# Patient Record
Sex: MALE | URBAN - METROPOLITAN AREA
[De-identification: names, ages, dates, MRNs, and addresses within clinical notes are randomized per-mention and may not be internally consistent; named-entity substitution may affect disease eponyms.]

---

## 2020-07-17 VITALS
TEMPERATURE: 97.6 F | HEIGHT: 71 IN | WEIGHT: 245 LBS | SYSTOLIC BLOOD PRESSURE: 140 MMHG | RESPIRATION RATE: 16 BRPM | DIASTOLIC BLOOD PRESSURE: 82 MMHG | HEART RATE: 150 BPM | BODY MASS INDEX: 34.3 KG/M2 | OXYGEN SATURATION: 95 %

## 2020-07-17 PROBLEM — Z98.84 H/O BARIATRIC SURGERY: Status: ACTIVE | Noted: 2020-07-17

## 2020-07-17 PROBLEM — K90.9 MALABSORPTION SYNDROME: Status: ACTIVE | Noted: 2020-07-17

## 2020-07-17 PROBLEM — Z86.39 HISTORY OF DIABETES MELLITUS: Status: ACTIVE | Noted: 2020-07-17

## 2020-07-17 RX ORDER — CYCLOBENZAPRINE HCL 10 MG
TABLET ORAL
COMMUNITY
End: 2020-12-04

## 2020-07-17 RX ORDER — METOPROLOL TARTRATE 25 MG/1
TABLET, FILM COATED ORAL 2 TIMES DAILY
COMMUNITY
End: 2020-12-04

## 2020-12-04 ENCOUNTER — VIRTUAL VISIT (OUTPATIENT)
Dept: FAMILY MEDICINE CLINIC | Age: 55
End: 2020-12-04
Payer: COMMERCIAL

## 2020-12-04 DIAGNOSIS — J18.9 PNEUMONIA DUE TO INFECTIOUS ORGANISM, UNSPECIFIED LATERALITY, UNSPECIFIED PART OF LUNG: ICD-10-CM

## 2020-12-04 DIAGNOSIS — J96.01 ACUTE RESPIRATORY FAILURE WITH HYPOXIA AND HYPERCAPNIA (HCC): ICD-10-CM

## 2020-12-04 DIAGNOSIS — J96.02 ACUTE RESPIRATORY FAILURE WITH HYPOXIA AND HYPERCAPNIA (HCC): ICD-10-CM

## 2020-12-04 DIAGNOSIS — C90.00 MULTIPLE MYELOMA, REMISSION STATUS UNSPECIFIED (HCC): ICD-10-CM

## 2020-12-04 DIAGNOSIS — Z09 HOSPITAL DISCHARGE FOLLOW-UP: Primary | ICD-10-CM

## 2020-12-04 DIAGNOSIS — R52 PAIN: ICD-10-CM

## 2020-12-04 DIAGNOSIS — S22.49XS CLOSED FRACTURE OF MULTIPLE RIBS, UNSPECIFIED LATERALITY, SEQUELA: ICD-10-CM

## 2020-12-04 DIAGNOSIS — R53.1 WEAKNESS GENERALIZED: ICD-10-CM

## 2020-12-04 DIAGNOSIS — S80.11XS HEMATOMA OF LEG, RIGHT, SEQUELA: ICD-10-CM

## 2020-12-04 PROBLEM — S22.49XA CLOSED FRACTURE OF MULTIPLE RIBS: Status: ACTIVE | Noted: 2020-12-04

## 2020-12-04 PROCEDURE — 99214 OFFICE O/P EST MOD 30 MIN: CPT | Performed by: STUDENT IN AN ORGANIZED HEALTH CARE EDUCATION/TRAINING PROGRAM

## 2020-12-04 RX ORDER — LANOLIN ALCOHOL/MO/W.PET/CERES
100 CREAM (GRAM) TOPICAL DAILY
COMMUNITY

## 2020-12-04 RX ORDER — BISMUTH SUBSALICYLATE 262 MG
1 TABLET,CHEWABLE ORAL DAILY
COMMUNITY

## 2020-12-04 RX ORDER — METOPROLOL TARTRATE 37.5 MG/1
1 TABLET, FILM COATED ORAL 2 TIMES DAILY
COMMUNITY
Start: 2020-09-03

## 2020-12-04 RX ORDER — CALCIUM CARBONATE/VITAMIN D3 500 MG-10
1 TABLET ORAL 3 TIMES DAILY
COMMUNITY

## 2020-12-04 RX ORDER — OXYCODONE HYDROCHLORIDE 10 MG/1
15 TABLET ORAL EVERY 12 HOURS
COMMUNITY
End: 2020-12-07 | Stop reason: SDUPTHER

## 2020-12-04 RX ORDER — GUAIFENESIN 600 MG/1
600 TABLET, EXTENDED RELEASE ORAL
COMMUNITY

## 2020-12-04 RX ORDER — FOLIC ACID 1 MG/1
1 TABLET ORAL DAILY
COMMUNITY

## 2020-12-04 RX ORDER — ONDANSETRON 4 MG/1
4 TABLET, FILM COATED ORAL
COMMUNITY
End: 2020-12-31

## 2020-12-04 RX ORDER — GABAPENTIN 600 MG/1
600 TABLET ORAL 3 TIMES DAILY
COMMUNITY
End: 2021-01-07 | Stop reason: SDUPTHER

## 2020-12-04 NOTE — PROGRESS NOTES
Consent: Danelle Osullivan, who was seen by synchronous (real-time) audio-video technology, and/or his healthcare decision maker, is aware that this patient-initiated, Telehealth encounter on 12/4/2020 is a billable service, with coverage as determined by his insurance carrier. He is aware that he may receive a bill and has provided verbal consent to proceed: YES-Consent obtained within past 12 months        712  Subjective:   Chief complaint: Respiratory failure, hospital follow-up    Danelle Osullivan is a 48 y.o. male who was seen for Hospital Follow Up (Respir failure [facility] 10/27/20-11/25/20)  Patient was contacted by Transitional Care Management services within two days after his discharge: Yes. This encounter and supporting documentation was reviewed if available. Medication reconciliation was performed today (12/4/2020). At this time I do not have the discharge summary notes available. We have requested them. History of mention here in this note is from his daughter and the patient. Patient presents via virtual visit today for hospital follow-up. Patient was admitted to [facility] with respiratory failure he was found to have ARDS, hypoxia, hypercapnia, pneumonia and was eventually intubated and transferred to the ICU. Treated with antibiotics for pneumonia. He was in hospital for a total of 6 weeks, back in forth between ICU multiple times. He was eventually discharged home with 3 L of oxygen, and BiPAP for the hypercapnia when sleeping. He is currently doing well on 2 L of oxygen and BiPAP, denies shortness of breath, or increased work of breathing. She is scheduled to follow-up with pulmonology at [facility]. While in the hospital he was diagnosed with multiple myeloma. Hypercalcemia was noted on labs, so oncology was consulted and further testing came back positive for MM. Diagnosis was confirmed with bone marrow biopsy. Imaging showed multiple bony lesions all over his body.   He was found to have rib fractures as well. He did have a fall about 2 months ago. He was discharged on oxycodone and gabapentin for the pain is scheduled to follow-up with oncology. Due to bleeding from laying in bed during his stay he was given physical therapy which improved his function. He still has difficulty moving but is able to transfer. He was ordered home physical therapy which will be started next week. He is currently using an elevated toilet when he goes to the bathroom, and uses a chair while bathing. He is currently getting help by his daughter and wife. Home health nurse visits multiple times a week, checks his lungs for wheezing, checks vitals. No wheezing today. He was discharged on other oral medications including thiamine, multivitamin, B12, vitamin D/calcium, folic acid, Mucinex, etc.  Daughter will be bringing in list on Monday. Patient also has a hematoma on his right leg which is improving, likely from central line placement. Printed daughter a stent was placed. Hematoma was originally about the size of a softball is much smaller now according to them. Home health care checks the hematoma as well. He said he was instructed to keep his legs elevated. Admitting symptoms have: Improved. Prior to Admission medications    Medication Sig Start Date End Date Taking? Authorizing Provider   metoprolol tartrate 37.5 mg tab Take 1 Tab by mouth two (2) times a day. 9/3/20  Yes Provider, Historical   folic acid (FOLVITE) 1 mg tablet Take 1 mg by mouth daily. Yes Provider, Historical   multivitamin (ONE A DAY) tablet Take 1 Tab by mouth daily. Yes Provider, Historical   thiamine HCL (B-1) 100 mg tablet Take 100 mg by mouth daily. Yes Provider, Historical   Calcium-Cholecalciferol, D3, 500 mg(1,250mg) -400 unit tab Take 1 Tab by mouth three (3) times daily. Yes Provider, Historical   gabapentin (NEURONTIN) 600 mg tablet Take 600 mg by mouth three (3) times daily.    Yes Provider, Historical   oxyCODONE IR (ROXICODONE) 10 mg tab immediate release tablet Take 15 mg by mouth every twelve (12) hours. Yes Provider, Historical   ondansetron hcl (ZOFRAN) 4 mg tablet Take 4 mg by mouth every eight (8) hours as needed for Nausea or Vomiting. Yes Provider, Historical   bipap machine kit by Does Not Apply route. Use when taking nap or sleeping at night   Yes Provider, Historical   oxygen-air delivery systems (PV CLASSIC OXYGEN CONCENTRATOR) 3 L by Does Not Apply route continuous. Yes Provider, Historical   guaiFENesin ER (Mucinex) 600 mg ER tablet Take 600 mg by mouth two (2) times daily as needed for Congestion. Yes Provider, Historical   apixaban (Eliquis) 5 mg tablet Take 5 mg by mouth two (2) times a day. Yes Provider, Historical   cyclobenzaprine (FLEXERIL) 10 mg tablet Take  by mouth three (3) times daily as needed for Muscle Spasm(s). 12/4/20  Provider, Historical   metoprolol tartrate (LOPRESSOR) 25 mg tablet Take  by mouth two (2) times a day. 12/4/20  Provider, Historical     Allergies   Allergen Reactions    Hydrocodone Nausea and Vomiting     Patient Active Problem List    Diagnosis    H/O bariatric surgery    History of diabetes mellitus    Malabsorption syndrome     Past Medical History:   Diagnosis Date    Arrhythmia     Atrial Flutter    Back pain     and neck    Diabetes ([address]     Resolved w/ gastric bypass surgery    Hx of long term use of blood thinners        Review of Systems   Constitutional: Negative for fever. Respiratory: Negative for shortness of breath. Cardiovascular: Negative for chest pain. Skin:        Hematoma   Neurological: Positive for weakness. Objective:   Vital Signs: (As obtained by patient/caregiver at home)  There were no vitals taken for this visit.      [INSTRUCTIONS:  \"[x]\" Indicates a positive item  \"[]\" Indicates a negative item  -- DELETE ALL ITEMS NOT EXAMINED]    Constitutional: [x] Appears well-developed and well-nourished [x] No apparent distress      [] Abnormal -     Mental status: [x] Alert and awake  [x] Oriented to person/place/time [x] Able to follow commands    [] Abnormal -     Eyes:   EOM    [x]  Normal    [] Abnormal -   Sclera  [x]  Normal    [] Abnormal -          Discharge [x]  None visible   [] Abnormal -     HENT: [x] Normocephalic, atraumatic  [] Abnormal -   [x] Mouth/Throat: Mucous membranes are moist    External Ears [x] Normal  [] Abnormal -    Neck: [x] No visualized mass [] Abnormal -     Pulmonary/Chest: [x] Respiratory effort normal   [x] No visualized signs of difficulty breathing or respiratory distress       On 3 L of oxygen nasal cannula       Neurological:        [x] No Facial Asymmetry (Cranial nerve 7 motor function) (limited exam due to video visit)          [x] No gaze palsy        [] Abnormal -          Skin:        [x] No significant exanthematous lesions or discoloration noted on facial skin         [] Abnormal -            Psychiatric:       [x] Normal Affect [] Abnormal -        [x] No Hallucinations    Other pertinent observable physical exam findings:-              Assessment & Plan:       1. Hospital discharge follow-up  -Discussed hospitalization with patient and daughter. Requested notes from hospitalization. Daughter with medication list to the office. Home health care ordered. 2. Acute respiratory failure with hypoxia and hypercapnia (HCC)      ARDS  -Improving. Currently on 2 L of oxygen, and BiPAP. Scheduled to follow-up with pulmonology at [facility]. 3. Multiple myeloma, remission status unspecified ([facility] [address]  -Newly diagnosed. Scheduled to follow-up with oncology. 4. Closed fracture of multiple ribs, unspecified laterality, sequela  -Secondary to multiple myeloma, and fall. 5. Pneumonia due to infectious organism, unspecified laterality, unspecified part of lung    6. Weakness generalized  -Home physical therapy    7. Pain  -Secondary to multiple myeloma. Currently on gabapentin and oxycodone. 8.  Right leg hematoma   - improving. Keep legs elevated. Being monitored by home health care. Follow-up and Dispositions    · Return in about 3 months (around 3/4/2021) for Wellness, or prior if needed. We discussed the expected course, resolution and complications of the diagnosis(es) in detail. Medication risks, benefits, costs, interactions, and alternatives were discussed as indicated. I advised him to contact the office if his condition worsens, changes or fails to improve as anticipated. He expressed understanding with the diagnosis(es) and plan. Monika Pulido is a 48 y.o. male being evaluated by a video visit encounter for concerns as above. A caregiver was present when appropriate. Due to this being a TeleHealth encounter (During [place] public health emergency), evaluation of the following organ systems was limited: Vitals/Constitutional/EENT/Resp/CV/GI//MS/Neuro/Skin/Heme-Lymph-Imm. Pursuant to the emergency declaration under the [facility], [facility] waiver authority and the [employer] Act, this Virtual  Visit was conducted, with patient's (and/or legal guardian's) consent, to reduce the patient's risk of exposure to COVID-19 and provide necessary medical care. Services were provided through a video synchronous discussion virtually to substitute for in-person clinic visit. Patient and provider were located at their individual homes.         Bharath Rodriguez MD

## 2020-12-07 DIAGNOSIS — C90.00 MULTIPLE MYELOMA, REMISSION STATUS UNSPECIFIED (HCC): Primary | ICD-10-CM

## 2020-12-07 DIAGNOSIS — S22.49XS CLOSED FRACTURE OF MULTIPLE RIBS, UNSPECIFIED LATERALITY, SEQUELA: ICD-10-CM

## 2020-12-07 RX ORDER — MELATONIN
1 DAILY
COMMUNITY
End: 2020-12-31

## 2020-12-07 RX ORDER — SENNOSIDES 8.6 MG/1
1 TABLET ORAL DAILY
COMMUNITY
End: 2020-12-31

## 2020-12-07 RX ORDER — ACETAMINOPHEN, DIPHENHYDRAMINE HCL, PHENYLEPHRINE HCL 325; 25; 5 MG/1; MG/1; MG/1
1 TABLET ORAL DAILY
COMMUNITY

## 2020-12-07 RX ORDER — OXYCODONE HYDROCHLORIDE 10 MG/1
10 TABLET ORAL
Qty: 60 TAB | Refills: 0 | Status: SHIPPED | OUTPATIENT
Start: 2020-12-07 | End: 2020-12-21 | Stop reason: SDUPTHER

## 2020-12-07 RX ORDER — PANTOPRAZOLE SODIUM 20 MG/1
20 TABLET, DELAYED RELEASE ORAL DAILY
COMMUNITY
End: 2020-12-31

## 2020-12-07 NOTE — PROGRESS NOTES
Pt's daughter called stating that pt needed a PA for the medication (Oxycodone). PA done and approved PA Case: [id number], Status: Approved, Coverage Starts on: 12/7/2020 12:00:00 AM, Coverage Ends on: 3/7/2021 12:00:00 AM.  Pt advised.

## 2020-12-21 ENCOUNTER — TELEPHONE (OUTPATIENT)
Dept: FAMILY MEDICINE CLINIC | Age: 55
End: 2020-12-21

## 2020-12-21 DIAGNOSIS — S22.49XS CLOSED FRACTURE OF MULTIPLE RIBS, UNSPECIFIED LATERALITY, SEQUELA: ICD-10-CM

## 2020-12-21 DIAGNOSIS — C90.00 MULTIPLE MYELOMA, REMISSION STATUS UNSPECIFIED (HCC): ICD-10-CM

## 2020-12-21 RX ORDER — OXYCODONE HYDROCHLORIDE 10 MG/1
10 TABLET ORAL
Qty: 60 TAB | Refills: 0 | Status: SHIPPED | OUTPATIENT
Start: 2020-12-21 | End: 2021-01-20

## 2020-12-21 NOTE — TELEPHONE ENCOUNTER
RX refill oxyCODINE IR 10 mg tab please send to [id number] [facility]

## 2020-12-22 ENCOUNTER — TELEPHONE (OUTPATIENT)
Dept: FAMILY MEDICINE CLINIC | Age: 55
End: 2020-12-22

## 2020-12-22 NOTE — TELEPHONE ENCOUNTER
PT called and stated that his RX oxyCODONE IR 10 mg was sent incorrectly. It says for pt to take ever 8 hrs as needed for pain. PT stated that his last RX of oxyCODONE IR 10 mg stated to take every 4 hours as needed. It also needs prior authorization.   Thank you

## 2020-12-23 NOTE — TELEPHONE ENCOUNTER
Patient is incorrect oxycodone  was reordered exactly the same way, as I ordered last time. If you can send me the preauthorization via the EMR I will do it today. If it is a paper preauthorization, then have one of the provider's that are in the office do it.

## 2020-12-28 NOTE — TELEPHONE ENCOUNTER
Pre authorization was already approved and the oncologist advised him to take the medication every four hours

## 2020-12-31 ENCOUNTER — VIRTUAL VISIT (OUTPATIENT)
Dept: FAMILY MEDICINE CLINIC | Age: 55
End: 2020-12-31
Payer: COMMERCIAL

## 2020-12-31 DIAGNOSIS — J96.01 ACUTE RESPIRATORY FAILURE WITH HYPOXIA AND HYPERCAPNIA (HCC): ICD-10-CM

## 2020-12-31 DIAGNOSIS — F43.22 ADJUSTMENT DISORDER WITH ANXIOUS MOOD: Primary | ICD-10-CM

## 2020-12-31 DIAGNOSIS — C90.00 MULTIPLE MYELOMA, REMISSION STATUS UNSPECIFIED (HCC): ICD-10-CM

## 2020-12-31 DIAGNOSIS — J96.02 ACUTE RESPIRATORY FAILURE WITH HYPOXIA AND HYPERCAPNIA (HCC): ICD-10-CM

## 2020-12-31 PROCEDURE — 99214 OFFICE O/P EST MOD 30 MIN: CPT | Performed by: STUDENT IN AN ORGANIZED HEALTH CARE EDUCATION/TRAINING PROGRAM

## 2020-12-31 RX ORDER — ESCITALOPRAM OXALATE 5 MG/1
5 TABLET ORAL DAILY
Qty: 30 TAB | Refills: 1 | Status: SHIPPED | OUTPATIENT
Start: 2020-12-31

## 2020-12-31 NOTE — PROGRESS NOTES
Consent: Aleah Gordon, who was seen by synchronous (real-time) audio-video technology, and/or his healthcare decision maker, is aware that this patient-initiated, Telehealth encounter on 12/31/2020 is a billable service, with coverage as determined by his insurance carrier. He is aware that he may receive a bill and has provided verbal consent to proceed: YES-Consent obtained within past 12 months        712  Subjective:   Aleah Gordon is a 48 y.o. male who was seen for Follow-up (Anxiety issues)    Complains of  anxiety, irritability, anger. Symptoms started about 2 weeks ago. Patient was recently admitted to the hospital found to have multiple myeloma, pneumonia, ARDS. He is on oxygen now. Feels like the symptoms are due to his cancer diagnosis, and worrying about his family. Unsure if he is depressed. His ALBINA-7 today was 17. Denies SI/HI. He has never been on antidepressant before. About a month ago was released from the hospital he was found to have acute respiratory failure, ARDS, multiple myeloma, HCAP, rib fractures, right leg hematoma (due to central line). Since having a virtual visit with him about a month ago he continues to be at home. His O2 had to be raised to 4 L, but now is down to 3 L. He continues to have occupational therapy, physical therapy, nurse, home health care visit him. He has been seeing his oncologist via virtual visit, he will be starting chemo and steroid therapy. Oncologist is Titus Cooks, and PM and R Charu Castillo. Current Outpatient Medications on File Prior to Visit   Medication Sig Dispense Refill    oxyCODONE IR (ROXICODONE) 10 mg tab immediate release tablet Take 1 Tab by mouth every eight (8) hours as needed for Pain for up to 30 days. Max Daily Amount: 30 mg. 60 Tab 0    melatonin 10 mg tab Take 1 Tab by mouth daily.  metoprolol tartrate 37.5 mg tab Take 1 Tab by mouth two (2) times a day.       folic acid (FOLVITE) 1 mg tablet Take 1 mg by mouth daily.  multivitamin (ONE A DAY) tablet Take 1 Tab by mouth daily.  thiamine HCL (B-1) 100 mg tablet Take 100 mg by mouth daily.  Calcium-Cholecalciferol, D3, 500 mg(1,250mg) -400 unit tab Take 1 Tab by mouth three (3) times daily.  gabapentin (NEURONTIN) 600 mg tablet Take 600 mg by mouth three (3) times daily.  bipap machine kit by Does Not Apply route. Use when taking nap or sleeping at night      oxygen-air delivery systems (PV CLASSIC OXYGEN CONCENTRATOR) 3 L by Does Not Apply route continuous.  guaiFENesin ER (Mucinex) 600 mg ER tablet Take 600 mg by mouth two (2) times daily as needed for Congestion.  apixaban (Eliquis) 5 mg tablet Take 5 mg by mouth two (2) times a day.  [DISCONTINUED] pantoprazole (PROTONIX) 20 mg tablet Take 20 mg by mouth daily.  [DISCONTINUED] senna (SENOKOT) 8.6 mg tablet Take 1 Tab by mouth daily.  [DISCONTINUED] cholecalciferol (VITAMIN D3) (1000 Units /25 mcg) tablet Take 1 Tab by mouth daily.  [DISCONTINUED] ondansetron hcl (ZOFRAN) 4 mg tablet Take 4 mg by mouth every eight (8) hours as needed for Nausea or Vomiting. No current facility-administered medications on file prior to visit.       Allergies   Allergen Reactions    Hydrocodone Nausea and Vomiting     Patient Active Problem List    Diagnosis    Hematoma of leg, right, sequela    Pain    Weakness generalized    Multiple myeloma ([address]    Acute respiratory failure with hypoxia and hypercapnia (HCC)    Closed fracture of multiple ribs    H/O bariatric surgery    History of diabetes mellitus    Malabsorption syndrome     Past Medical History:   Diagnosis Date    ARDS (adult respiratory distress syndrome) ([address] 10/2020    Arrhythmia     Atrial Flutter    Back pain     and neck    Diabetes ([address]     Resolved w/ gastric bypass surgery    Hx of long term use of blood thinners        Review of Systems   Constitutional: Negative for fever. Respiratory:        As stated in HPI     Psychiatric/Behavioral: The patient is nervous/anxious. Objective:   Vital Signs: (As obtained by patient/caregiver at home)  There were no vitals taken for this visit. [INSTRUCTIONS:  \"[x]\" Indicates a positive item  \"[]\" Indicates a negative item  -- DELETE ALL ITEMS NOT EXAMINED]    Constitutional: [x] Appears well-developed and well-nourished [x] No apparent distress      [] Abnormal -     Mental status: [x] Alert and awake  [x] Oriented to person/place/time [x] Able to follow commands    [] Abnormal -     Eyes:   EOM    [x]  Normal    [] Abnormal -   Sclera  [x]  Normal    [] Abnormal -          Discharge [x]  None visible   [] Abnormal -     HENT: [x] Normocephalic, atraumatic  [] Abnormal -   [x] Mouth/Throat: Mucous membranes are moist    External Ears [x] Normal  [] Abnormal -    Neck: [x] No visualized mass [] Abnormal -     Pulmonary/Chest: [x] Respiratory effort normal   [x] No visualized signs of difficulty breathing or respiratory distress        [x] Abnormal -on 3L O2 NC       Neurological:        [x] No Facial Asymmetry (Cranial nerve 7 motor function) (limited exam due to video visit)          [x] No gaze palsy        [] Abnormal -          Skin:        [x] No significant exanthematous lesions or discoloration noted on facial skin         [] Abnormal -            Psychiatric:       [x] Normal Affect [] Abnormal -        [x] No Hallucinations                  Assessment & Plan:       1. Adjustment disorder with anxious mood  -    Secondary to recent diagnosis of multiple myeloma, being homebound. ALBINA-717. Likely has depression/or down mood as well. Start Lexapro. Follow-up in 1 month. -     escitalopram oxalate (LEXAPRO) 5 mg tablet; Take 1 Tab by mouth daily. 2. Multiple myeloma, remission status unspecified ([facility] [address]  -Following with heme-onc, will start chemotherapy and steroids soon.   Will attempt to obtain records from heme-onc. Continues oxycodone for pain. 3. Acute respiratory failure with hypoxia and hypercapnia (HCC)  -Currently on 3L O2. Continue on O2, wean as tolerated. Continues to have OT, PT, PMand R, home health care, nurse who sees him at home, since being discharged from the hospital.           Follow-up and Dispositions    · Return in about 4 weeks (around 1/28/2021) for Anxiety. We discussed the expected course, resolution and complications of the diagnosis(es) in detail. Medication risks, benefits, costs, interactions, and alternatives were discussed as indicated. I advised him to contact the office if his condition worsens, changes or fails to improve as anticipated. He expressed understanding with the diagnosis(es) and plan. Nimco Trejo is a 48 y.o. male being evaluated by a video visit encounter for concerns as above. A caregiver was present when appropriate. Due to this being a TeleHealth encounter (During [place] [address] public health emergency), evaluation of the following organ systems was limited: Vitals/Constitutional/EENT/Resp/CV/GI//MS/Neuro/Skin/Heme-Lymph-Imm. Pursuant to the emergency declaration under the [facility] waiver authority and the [employer] Act, this Virtual  Visit was conducted, with patient's (and/or legal guardian's) consent, to reduce the patient's risk of exposure to COVID-19 and provide necessary medical care. Services were provided through a video synchronous discussion virtually to substitute for in-person clinic visit. Patient and provider were located at their individual homes.         Juan J Retana MD

## 2020-12-31 NOTE — PATIENT INSTRUCTIONS
Escitalopram (By mouth) Escitalopram (ui-[id number]-IAD-oh-pram) Treats depression and generalized anxiety disorder (ALBINA). Brand Name(s): Lexapro There may be other brand names for this medicine. When This Medicine Should Not Be Used: This medicine is not right for everyone. Do not use it if you had an allergic reaction to escitalopram or citalopram. 
How to Use This Medicine:  
Liquid, Tablet · Take this medicine as directed. You may need to take it for a month or more before you feel better. Your dose may need to be changed to find out what works best for you. · Measure the oral liquid medicine with a marked measuring spoon, oral syringe, or medicine cup. · This medicine should come with a Medication Guide. Ask your pharmacist for a copy if you do not have one. · Missed dose: Take a dose as soon as you remember. If it is almost time for your next dose, wait until then and take a regular dose. Do not take extra medicine to make up for a missed dose. · Store the medicine in a closed container at room temperature, away from heat, moisture, and direct light. Drugs and Foods to Avoid: Ask your doctor or pharmacist before using any other medicine, including over-the-counter medicines, vitamins, and herbal products. · Do not use this medicine together with pimozide. Do not use this medicine and an MAO inhibitor (MAOI) within 14 days of each other. · Some medicines can affect how escitalopram works. Tell your doctor if you are using the following:  
¨ Buspirone, carbamazepine, fentanyl, lithium, Jordana's wort, tramadol, or tryptophan supplements ¨ Amphetamines ¨ Blood thinner (including warfarin) ¨ Diuretic (water pill) ¨ NSAID pain or arthritis medicine (including aspirin, celecoxib, diclofenac, ibuprofen, naproxen) ¨ Triptan medicine to treat migraine headaches (including sumatriptan) · Tell your doctor if you use anything else that makes you sleepy. Some examples are allergy medicine, narcotic pain medicine, and alcohol. · Do not drink alcohol while you are using this medicine. Warnings While Using This Medicine: · Tell your doctor if you are pregnant or breastfeeding, or if you have kidney disease, liver disease, bleeding problems, glaucoma, heart disease, or a seizure disorder. · For some children, teenagers, and young adults, this medicine may increase mental or emotional problems. This may lead to thoughts of suicide and violence. Talk with your doctor right away if you have any thoughts or behavior changes that concern you. Tell your doctor if you or anyone in your family has a history of bipolar disorder or suicide attempts. · This medicine may cause the following problems:  
¨ Serotonin syndrome (more likely when taken with certain medicines) ¨ Low sodium levels ¨ Increased risk of bleeding problems · This medicine may make you dizzy or drowsy. Do not drive or do anything that could be dangerous until you know how this medicine affects you. · Your doctor may want to monitor your child's weight and height, because this medicine may cause decreased appetite and weight loss in children. · Do not stop using this medicine suddenly. Your doctor will need to slowly decrease your dose before you stop it completely. · Your doctor will check your progress and the effects of this medicine at regular visits. Keep all appointments. · Keep all medicine out of the reach of children. Never share your medicine with anyone. Possible Side Effects While Using This Medicine:  
Call your doctor right away if you notice any of these side effects: · Allergic reaction: Itching or hives, swelling in your face or hands, swelling or tingling in your mouth or throat, chest tightness, trouble breathing · Anxiety, restlessness, fever, sweating, muscle spasms, nausea, vomiting, diarrhea, seeing or hearing things that are not there · Confusion, weakness, and muscle twitching · Eye pain, vision changes, seeing halos around lights · Fast, pounding, or uneven heartbeat · Feeling more excited or energetic than usual, racing thoughts, trouble sleeping · Seizures · Thoughts of hurting yourself or others, unusual behavior · Unusual bleeding or bruising If you notice these less serious side effects, talk with your doctor: · Dizziness, drowsiness, or sleepiness · Dry mouth 
· Headache · Nausea, constipation, diarrhea · Sexual problems If you notice other side effects that you think are caused by this medicine, tell your doctor. Call your doctor for medical advice about side effects. You may report side effects to FDA at 7-764-FDA-5692 © 2017 [address] Information is for End User's use only and may not be sold, redistributed or otherwise used for commercial purposes. The above information is an  only. It is not intended as medical advice for individual conditions or treatments. Talk to your doctor, nurse or pharmacist before following any medical regimen to see if it is safe and effective for you. Anxiety Disorder: Care Instructions Your Care Instructions Anxiety is a normal reaction to stress. Difficult situations can cause you to have symptoms such as sweaty palms and a nervous feeling. In an anxiety disorder, the symptoms are far more severe. Constant worry, muscle tension, trouble sleeping, nausea and diarrhea, and other symptoms can make normal daily activities difficult or impossible. These symptoms may occur for no reason, and they can affect your work, school, or social life. Medicines, counseling, and self-care can all help. Follow-up care is a key part of your treatment and safety. Be sure to make and go to all appointments, and call your doctor if you are having problems. It's also a good idea to know your test results and keep a list of the medicines you take. How can you care for yourself at home? · Take medicines exactly as directed. Call your doctor if you think you are having a problem with your medicine. · Go to your counseling sessions and follow-up appointments. · Recognize and accept your anxiety. Then, when you are in a situation that makes you anxious, say to yourself, \"This is not an emergency. I feel uncomfortable, but I am not in danger. I can keep going even if I feel anxious. \" · Be kind to your body: 
? Relieve tension with exercise or a massage. ? Get enough rest. 
? Avoid alcohol, caffeine, nicotine, and illegal drugs. They can increase your anxiety level and cause sleep problems. ? Learn and do relaxation techniques. See below for more about these techniques. · Engage your mind. Get out and do something you enjoy. Go to a [employer] movie, or take a walk or hike. Plan your day. Having too much or too little to do can make you anxious. · Keep a record of your symptoms. Discuss your fears with a good friend or family member, or join a support group for people with similar problems. Talking to others sometimes relieves stress. · Get involved in social groups, or volunteer to help others. Being alone sometimes makes things seem worse than they are. · Get at least 30 minutes of exercise on most days of the week to relieve stress. Walking is a good choice. You also may want to do other activities, such as running, swimming, cycling, or playing tennis or team sports. Relaxation techniques Do relaxation exercises 10 to 20 minutes a day. You can play soothing, relaxing music while you do them, if you wish. · Tell others in your house that you are going to do your relaxation exercises. Ask them not to disturb you. · Find a comfortable place, away from all distractions and noise. · Lie down on your back, or sit with your back straight. · Focus on your breathing. Make it slow and steady. · Breathe in through your nose. Breathe out through either your nose or mouth. · Breathe deeply, filling up the area between your navel and your rib cage. Breathe so that your belly goes up and down. · Do not hold your breath. · Breathe like this for 5 to 10 minutes. Notice the feeling of calmness throughout your whole body. As you continue to breathe slowly and deeply, relax by doing the following for another 5 to 10 minutes: · Tighten and relax each muscle group in your body. You can begin at your toes and work your way up to your head. · Imagine your muscle groups relaxing and becoming heavy. · Empty your mind of all thoughts. · Let yourself relax more and more deeply. · Become aware of the state of calmness that surrounds you. · When your relaxation time is over, you can bring yourself back to alertness by moving your fingers and toes and then your hands and feet and then stretching and moving your entire body. Sometimes people fall asleep during relaxation, but they usually wake up shortly afterward. · Always give yourself time to return to full alertness before you drive a car or do anything that might cause an accident if you are not fully alert. Never play a relaxation tape while you drive a car. When should you call for help? Call 911 anytime you think you may need emergency care. For example, call if: 
  · You feel you cannot stop from hurting yourself or someone else. Keep the numbers for these national suicide hotlines: 7-250-870-TALK (9-479-929-801.893.3017) and 2-472-ILBBYTU (1-734-168-719.290.9884). If you or someone you know talks about suicide or feeling hopeless, get help right away. Watch closely for changes in your health, and be sure to contact your doctor if: 
  · You have anxiety or fear that affects your life.   · You have symptoms of anxiety that are new or different from those you had before. Where can you learn more? Go to http://www.[employer].com/ Enter P754 in the search box to learn more about \"Anxiety Disorder: Care Instructions. \" Current as of: January 31, 2020               Content Version: 12.6 © 1797-3420 [employer]. Care instructions adapted under license by [employer] (which disclaims liability or warranty for this information). If you have questions about a medical condition or this instruction, always ask your healthcare professional. [address] any warranty or liability for your use of this information.

## 2021-01-07 ENCOUNTER — TELEPHONE (OUTPATIENT)
Dept: FAMILY MEDICINE CLINIC | Age: 56
End: 2021-01-07

## 2021-01-07 DIAGNOSIS — C90.00 MULTIPLE MYELOMA, REMISSION STATUS UNSPECIFIED (HCC): ICD-10-CM

## 2021-01-07 DIAGNOSIS — R52 PAIN: Primary | ICD-10-CM

## 2021-01-07 RX ORDER — GABAPENTIN 600 MG/1
600 TABLET ORAL 3 TIMES DAILY
Qty: 90 TAB | Refills: 2 | Status: SHIPPED | OUTPATIENT
Start: 2021-01-07 | End: 2021-05-19

## 2021-01-07 NOTE — TELEPHONE ENCOUNTER
Pt called to notify us that he did some research on LEXAPRO 5 mg tablet and refuses to take.  Pt never picked up RX from Pharmacy

## 2022-03-18 PROBLEM — Z86.39 HISTORY OF DIABETES MELLITUS: Status: ACTIVE | Noted: 2020-07-17

## 2022-03-18 PROBLEM — Z98.84 H/O BARIATRIC SURGERY: Status: ACTIVE | Noted: 2020-07-17

## 2022-03-18 PROBLEM — R52 PAIN: Status: ACTIVE | Noted: 2020-12-04

## 2022-03-18 PROBLEM — S22.49XA CLOSED FRACTURE OF MULTIPLE RIBS: Status: ACTIVE | Noted: 2020-12-04

## 2022-03-18 PROBLEM — R53.1 WEAKNESS GENERALIZED: Status: ACTIVE | Noted: 2020-12-04

## 2022-03-18 PROBLEM — C90.00 MULTIPLE MYELOMA (HCC): Status: ACTIVE | Noted: 2020-12-04

## 2022-03-18 PROBLEM — S80.11XS HEMATOMA OF LEG, RIGHT, SEQUELA: Status: ACTIVE | Noted: 2020-12-04

## 2022-03-19 PROBLEM — F43.22 ADJUSTMENT DISORDER WITH ANXIOUS MOOD: Status: ACTIVE | Noted: 2020-12-31

## 2022-03-19 PROBLEM — K90.9 MALABSORPTION SYNDROME: Status: ACTIVE | Noted: 2020-07-17

## 2022-03-19 PROBLEM — J96.01 ACUTE RESPIRATORY FAILURE WITH HYPOXIA AND HYPERCAPNIA (HCC): Status: ACTIVE | Noted: 2020-12-04

## 2022-03-19 PROBLEM — J96.02 ACUTE RESPIRATORY FAILURE WITH HYPOXIA AND HYPERCAPNIA (HCC): Status: ACTIVE | Noted: 2020-12-04

## 2023-05-10 RX ORDER — GABAPENTIN 600 MG/1
TABLET ORAL
COMMUNITY
Start: 2021-05-19

## 2023-05-10 RX ORDER — LANOLIN ALCOHOL/MO/W.PET/CERES
100 CREAM (GRAM) TOPICAL DAILY
COMMUNITY

## 2023-05-10 RX ORDER — CALCIUM CARBONATE/VITAMIN D3 500 MG-10
1 TABLET ORAL 3 TIMES DAILY
COMMUNITY

## 2023-05-10 RX ORDER — FOLIC ACID 1 MG/1
1 TABLET ORAL DAILY
COMMUNITY

## 2023-05-10 RX ORDER — PHENOL 1.4 %
1 AEROSOL, SPRAY (ML) MUCOUS MEMBRANE DAILY
COMMUNITY

## 2023-05-10 RX ORDER — METOPROLOL TARTRATE 37.5 MG/1
1 TABLET, FILM COATED ORAL 2 TIMES DAILY
COMMUNITY
Start: 2020-09-03

## 2023-05-10 RX ORDER — ESCITALOPRAM OXALATE 5 MG/1
TABLET ORAL DAILY
COMMUNITY
Start: 2020-12-31

## 2023-05-10 RX ORDER — GUAIFENESIN 600 MG/1
TABLET, EXTENDED RELEASE ORAL 2 TIMES DAILY PRN
COMMUNITY